# Patient Record
(demographics unavailable — no encounter records)

---

## 2024-10-09 NOTE — HISTORY OF PRESENT ILLNESS
[FreeTextEntry1] : Patient is a 42 yo F who presents for breast cancer screening. Hx of RIGHT bx proven FA in 2017. Family history of breast cancer in maternal uncle (age 57), multiple cousins, and paternal grandmother. Pt's mother is BRCA/full panel negative (no cancer, tested 2015). Patient denies palpable masses, skin changes, or nipple discharge bilaterally. Of note, patient has previously declined continuation of high risk MRI imaging.  DIMITRIOS Lifetime Risk- 19.8% without consideration of paternal uncle.   2017: R US Core BX (1.5cm mass at 4:00) - fibroadenoma. 4/11/18: B/L US- R- stable 1.5cm mass at 4:00. L wnl. Rec f/u in 1 year. 4/10/19: B/L US- R- stable 1.4cm mass at 4:00; 0.6cm marginated mass vs fatty lobule at 2:30; 0.5cm complicated cyst vs intramammary LN at 10:00. L wnl. Rec f/u in 1 year. 8/16/19: B/l MG & US- right breast faint microcalcifications- 6 month follow-up recommended 8/19/20: B/l MG- [facility] 8/19/20: MRI- right 0.6 cm mass upper outer quadrant, left outer skin enhancement-probably benign. Rec 6m f/u 2/19/21: MRI- [facility] BIRADS 2  8/30/21: B/L MG & US- heterogeneously dense. R stable 1.3cm mass 4:00 3FN. R cysts. [facility]. BI-RADS 2 9/15/22: B/l MG & US- extremely dense, b/l appearing nodules. [facility]. BIRADS 2  10/4/23: B/l MG & US- extremely dense, multiple b/l appearing nodules vs cysts. [facility]. BIRADS 2. 10/9/2024: B/L MG/US: Extremely dense.  B/L scattered benign-appearing calcs.  US-R stable 1.3 cm 4:00 3 FN mass.  2 subcentimeter R breast cysts.  [facility].  BI-RADS 2.

## 2024-10-09 NOTE — PHYSICAL EXAM
[de-identified] : Bilateral breast/axilla/supraclavicular area: No masses, discharge, or adenopathy\par

## 2024-10-09 NOTE — HISTORY OF PRESENT ILLNESS
[FreeTextEntry1] : Patient is a 40 yo F who presents for breast cancer screening. Hx of RIGHT bx proven FA in 2017. Family history of breast cancer in maternal uncle (age 57), multiple cousins, and paternal grandmother. Pt's mother is BRCA/full panel negative (no cancer, tested 2015). Patient denies palpable masses, skin changes, or nipple discharge bilaterally. Of note, patient has previously declined continuation of high risk MRI imaging.  DIMITRIOS Lifetime Risk- 19.8% without consideration of paternal uncle.   2017: R US Core BX (1.5cm mass at 4:00) - fibroadenoma. 4/11/18: B/L US- R- stable 1.5cm mass at 4:00. L wnl. Rec f/u in 1 year. 4/10/19: B/L US- R- stable 1.4cm mass at 4:00; 0.6cm marginated mass vs fatty lobule at 2:30; 0.5cm complicated cyst vs intramammary LN at 10:00. L wnl. Rec f/u in 1 year. 8/16/19: B/l MG & US- right breast faint microcalcifications- 6 month follow-up recommended 8/19/20: B/l MG- [facility] 8/19/20: MRI- right 0.6 cm mass upper outer quadrant, left outer skin enhancement-probably benign. Rec 6m f/u 2/19/21: MRI- [facility] BIRADS 2  8/30/21: B/L MG & US- heterogeneously dense. R stable 1.3cm mass 4:00 3FN. R cysts. [facility]. BI-RADS 2 9/15/22: B/l MG & US- extremely dense, b/l appearing nodules. [facility]. BIRADS 2  10/4/23: B/l MG & US- extremely dense, multiple b/l appearing nodules vs cysts. [facility]. BIRADS 2. 10/9/2024: B/L MG/US: Extremely dense.  B/L scattered benign-appearing calcs.  US-R stable 1.3 cm 4:00 3 FN mass.  2 subcentimeter R breast cysts.  [facility].  BI-RADS 2.    Complex Repair And Graft Additional Text (Will Appearing After The Standard Complex Repair Text): The complex repair was not sufficient to completely close the primary defect. The remaining additional defect was repaired with the graft mentioned below.

## 2024-10-09 NOTE — PHYSICAL EXAM
[de-identified] : Bilateral breast/axilla/supraclavicular area: No masses, discharge, or adenopathy\par

## 2024-10-09 NOTE — PAST MEDICAL HISTORY
[History of Hormone Replacement Treatment] : has no history of hormone replacement treatment [FreeTextEntry5] : no [FreeTextEntry6] : None [FreeTextEntry7] : h/o OCP use [FreeTextEntry8] : NA